# Patient Record
Sex: FEMALE | Race: WHITE
[De-identification: names, ages, dates, MRNs, and addresses within clinical notes are randomized per-mention and may not be internally consistent; named-entity substitution may affect disease eponyms.]

---

## 2019-01-14 ENCOUNTER — HOSPITAL ENCOUNTER (INPATIENT)
Dept: HOSPITAL 96 - M.REH | Age: 84
LOS: 12 days | Discharge: HOME HEALTH | DRG: 536 | End: 2019-01-26
Attending: PHYSICAL MEDICINE & REHABILITATION | Admitting: PHYSICAL MEDICINE & REHABILITATION
Payer: MEDICARE

## 2019-01-14 VITALS — DIASTOLIC BLOOD PRESSURE: 45 MMHG | SYSTOLIC BLOOD PRESSURE: 155 MMHG

## 2019-01-14 VITALS — WEIGHT: 129 LBS | HEIGHT: 64 IN | BODY MASS INDEX: 22.02 KG/M2

## 2019-01-14 VITALS — DIASTOLIC BLOOD PRESSURE: 46 MMHG | SYSTOLIC BLOOD PRESSURE: 162 MMHG

## 2019-01-14 DIAGNOSIS — Z98.42: ICD-10-CM

## 2019-01-14 DIAGNOSIS — Z86.711: ICD-10-CM

## 2019-01-14 DIAGNOSIS — Z90.12: ICD-10-CM

## 2019-01-14 DIAGNOSIS — Z88.2: ICD-10-CM
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
## 2019-01-14 DIAGNOSIS — M34.9: ICD-10-CM

## 2019-01-14 DIAGNOSIS — R53.81: ICD-10-CM

## 2019-01-14 DIAGNOSIS — E03.9: ICD-10-CM

## 2019-01-14 DIAGNOSIS — S72.002A: Primary | ICD-10-CM

## 2019-01-14 DIAGNOSIS — Z83.3: ICD-10-CM

## 2019-01-14 DIAGNOSIS — I12.9: ICD-10-CM

## 2019-01-14 DIAGNOSIS — N18.3: ICD-10-CM

## 2019-01-14 DIAGNOSIS — R32: ICD-10-CM
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
## 2019-01-14 DIAGNOSIS — Z87.440: ICD-10-CM

## 2019-01-14 DIAGNOSIS — Z98.41: ICD-10-CM

## 2019-01-14 DIAGNOSIS — Z85.3: ICD-10-CM

## 2019-01-14 DIAGNOSIS — Z91.048: ICD-10-CM

## 2019-01-14 DIAGNOSIS — G89.29: ICD-10-CM
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
## 2019-01-14 DIAGNOSIS — Z80.8: ICD-10-CM

## 2019-01-14 DIAGNOSIS — Z82.49: ICD-10-CM

## 2019-01-14 DIAGNOSIS — J45.909: ICD-10-CM
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
## 2019-01-14 DIAGNOSIS — Z90.710: ICD-10-CM

## 2019-01-14 DIAGNOSIS — Z91.040: ICD-10-CM

## 2019-01-14 DIAGNOSIS — M19.90: ICD-10-CM

## 2019-01-14 DIAGNOSIS — Z86.718: ICD-10-CM

## 2019-01-14 NOTE — H
[place] [address]                    HISTORY AND PHYSICAL          
_______________________________________________________________________________
 
Name:       JEMIMA JONES             Room:           [address] [place]  
[facility].#:  [id number]      Account #:      [id number]  
Admission:  01/14/19     Attend Phys:    Isabel Mcgee DO   
Discharge:               Date of Birth:  [birth year]  
         Report #: [id number]
                                                                     [id number]  
_______________________________________________________________________________
THIS REPORT FOR:  //name//                      
 
CC: Elly Mcgee
 
DATE OF SERVICE:  01/14/2019
 
 
[facility] code is 8.2.
 
HISTORY OF PRESENT ILLNESS:  This is an 85-year-old female admitted to inpatient
rehabilitation to facilitate safe discharge home, status post acute
hospitalization at [facility] on 01/09/2019 following a fall from standing
height, sustaining a left femur fracture of the neck, status post left
hemiarthroplasty, weightbearing as tolerated.  She also had a diagnosis and
history of UTI.  Previous level of function was independent with activities of
daily living.  Current level of function is minimum to moderate assistance of
1-2 depending on therapy, activity and time of day.  Estimated length of stay is
10-12 days with discharge disposition to the home setting where she has three
steps to enter as an accessible house.  She lives in an independent living
senior housing complex.  No changes since the preadmission screening.  Multiple
medical comorbidities requiring acute daily medical care including current UTI,
being treated with antibiotics; hypertension; history of cancer; depression;
chronic back and knee pain due to osteoarthritis; connective tissue disease;
herniated disk; PE and DVT in the past and sick sinus syndrome.
 
PAST MEDICAL HISTORY:  Left hip fracture post-hemiarthroplasty, UTI, essential
hypertension, left-sided breast cancer, hypothyroid, depression, chronic pain in
the back and knees, osteoarthritis, bilateral lower extremity in the knees,
connective tissue disease, lumbar herniated disk, lumbar radiculopathy, PE, DVT,
sick sinus syndrome, lupus, and asthma.
 
PAST SURGICAL HISTORY:  Hysterectomy, ankle fracture, tonsillectomy, cataract
surgery, back surgery, mastectomy and a hernia repair, L4-L5 microdiskectomy.
 
FAMILY HISTORY:  Cancer, hypertension, cardiac disease.
 
ALLERGIES:  ADHESIVE TAPE, SILICONE, LATEX, SULFA AND ADHESIVE.
 
SOCIAL HISTORY:  No tobacco, alcohol or illicit drug use.
 
MEDICATIONS:  Reviewed, reconciled and are available in the MAR.
 
REVIEW OF SYSTEMS:  A 14-point review of systems is done and is negative except
as mentioned in HPI, specifically no fever, chest pain, shortness of breath,
abdominal pain or distention, change in bowel or change in bladder.  Pain is
 
 
 
[place] [address]                    HISTORY AND PHYSICAL          
_______________________________________________________________________________
 
Name:       JEMIMA JONES             Room:           [address] [place]  
[facility]#:  [id number]      Account #:      [id number]  
Admission:  01/14/19     Attend Phys:    Isabel Mcgee DO   
Discharge:               Date of Birth:  [birth year]  
         Report #: [id number]
                                                                     [id number]  
_______________________________________________________________________________
well controlled.
 
PHYSICAL EXAMINATION:
GENERAL:  Alert, oriented, no apparent distress.
VITAL SIGNS:  Reviewed and are stable.
HEENT:  Head atraumatic, normocephalic.  Pupils equal, round, reactive.
ABDOMEN:  Soft, nontender, nondistended.
NEUROLOGIC:  Cranial nerves 2-12 are grossly intact.  No focal neuro deficits,
5/5 strength in bilateral upper and lower extremities.
SKIN:  Warm and dry.  No rashes or lesions noted.
MUSCULOSKELETAL:  No clubbing, cyanosis or edema.
 
ASSESSMENT:
1.  Status post fall sustaining a left femoral neck fracture, status post left
hemiarthroplasty on 01/09/2019.
2.  Multiple medical comorbidities requiring acute daily medical care.
3.  Multiple medical comorbidities.
4.  Debility with alterations in activities of daily living.
 
PLAN:
1.  Admission to inpatient rehabilitation.
2.  PT, OT, case management, nursing and HIMS to make evaluations and
recommendations.
3.  Based on her age of 85, we will do a cognitive evaluation.
4.  We will team her weekly and plan of care is pending.
 
 
 
 
 
 
 
 
 
 
 
 
 
 
 
 
 
 
 
                       
                                        By:                                
                 
D: 01/15/19 1712_______________________________________
T: 01/15/19 1812Isabel Mcgee DO               /nt

## 2019-01-14 NOTE — NUR
PT  PER W/C AND IS ALERT AND ORIENTATED. PT ORIENTATED TO ROOM AND
CONTROLS,FALL PRECAUTIONS AND REHAB PROGRAM.PT DENIES PAIN AT PRESENT. PHONE
AND CALL LIGHT IN REACH.FAMILY AT BEDSIDE.

## 2019-01-14 NOTE — D
[facility] 
[address] [place]  [address]                    DISCHARGE SUMMARY             
_______________________________________________________________________________
 
Name:       JEMIMA JONES             Room:           [address] [place]  
[facility]#:  [id number]      Account #:      [id number]  
Admission:  01/14/19     Attend Phys:    Isabel Mcgee DO   
Discharge:               Date of Birth:  [birth year]  
         Report #: [id number]
                                                                     [id number]  
_______________________________________________________________________________
THIS REPORT FOR:  //name//                      
 
CC: Elly Mcgee
 
DISCHARGE DIAGNOSIS:  Left hip fracture.
 
DISCHARGE DISPOSITION:  She is being discharged to home with home health
services, nursing, OT and PT.
 
FOLLOWUP:  She will follow up with Ortho in 1-2 weeks and with her primary care
provider in 1 week.
 
DISCHARGE INSTRUCTIONS:  Notifications for physician were given.
 
DIET:  She is on a regular diet.
 
Monitor weight gain.
 
ACTIVITY PRECAUTION AND LIMITATIONS:  Hip precautions and front wheel walker for
ambulation.
 
Okay to discharge home on 01/26/2019.
 
DISCHARGE MEDICATIONS:  Medications were reviewed and were reconciled by myself
and are available in the MAR.
 
A prescription was given for tramadol 50 mg tablet p.o. every 6 hours as needed,
#20.
 
DISCHARGE PHYSICAL EXAMINATION:
GENERAL:  Includes alert and oriented x 3, stable.
NEUROLOGIC:  Cranial nerves 2 through 12 within normal limits and intact.
ABDOMEN:  Nontender, nondistended.
LUNGS:  Symmetrical.
HEART:  Clear to auscultation bilaterally.
HEENT:  SUMMER LABOY.
 
 
 
 
 
 
 
                       
                                        By:                                
                 
D: 01/25/19 1205_______________________________________
T: 01/25/19 1221Kelmaria c Mcgee DO               /nt

## 2019-01-14 NOTE — PLAN
[address]  [address]                    REHAB UNIT PLAN OF CARE       
_______________________________________________________________________________
 
Name:       JEMIMA JONES             Room:           [address] [place]  
[facility].#:  [id number]      Account #:      [id number]  
Admission:  01/14/19     Attend Phys:    Isabel Mcgee DO   
Discharge:               Date of Birth:  [birth year]  
         Report #: [id number]
                                                                     [id number]  
_______________________________________________________________________________
THIS REPORT FOR:  //name//                      
 
CC: Elly Mcgee
 
This is an 85-year-old female status post fall from standing height, sustaining
a left femoral neck fracture, post left hemiarthroplasty on 01/09/2019 with
concomitant UTI being treated with antibiotics upon admission.  Previous level
of function was independent with all activities of daily living.  Current level
of function is minimum-to-moderate assistance of 1-2 depending on therapy,
activity and time of day.  Estimated length of stay is 10-12 days with discharge
disposition to the home setting where she lives in an accessible house,
independent living for seniors.
 
MEDICAL PROGNOSIS:  Good.
 
REHABILITATION PROGNOSIS:  Good.
 
Physical therapy will see the patient 60-90 minutes per day, 5 days per week,
working on upper and lower body strength, balance, coordination, navigation.
 
Occupational therapy will work with the patient 60-90 minutes per day, 5 days
per week, working on upper and lower body strength, balance, coordination,
navigation, bathing, dressing, and toileting.
 
Speech and language pathology based on her age and independent living status, we
will conduct a cognitive evaluation and treat the patient 30-60 minutes per day,
5 days per week as needed on expression, memory, strategies, cognition.
 
This is an overall plan of care, may change from time to time.  We will team
weekly and make changes to plan of care as needed.
 
 
 
 
 
 
 
 
 
 
 
 
 
                       
                                        By:                                
                 
D: 01/15/19 1713_______________________________________
T: 01/16/19 1216Isabel Mcgee DO               /nt

## 2019-01-15 VITALS — SYSTOLIC BLOOD PRESSURE: 134 MMHG | DIASTOLIC BLOOD PRESSURE: 39 MMHG

## 2019-01-15 VITALS — SYSTOLIC BLOOD PRESSURE: 142 MMHG | DIASTOLIC BLOOD PRESSURE: 42 MMHG

## 2019-01-15 LAB
ANION GAP SERPL CALC-SCNC: 8 MMOL/L (ref 7–16)
BUN SERPL-MCNC: 23 MG/DL (ref 7–18)
CALCIUM SERPL-MCNC: 9.1 MG/DL (ref 8.5–10.1)
CHLORIDE SERPL-SCNC: 105 MMOL/L (ref 98–107)
CO2 SERPL-SCNC: 27 MMOL/L (ref 21–32)
CREAT SERPL-MCNC: 1.1 MG/DL (ref 0.6–1.3)
GLUCOSE SERPL-MCNC: 94 MG/DL (ref 70–99)
HCT VFR BLD CALC: 23.3 % (ref 37–47)
HGB BLD-MCNC: 7.8 GM/DL (ref 12–15)
MCH RBC QN AUTO: 31.3 PG (ref 26–34)
MCHC RBC AUTO-ENTMCNC: 33.3 G/DL (ref 28–37)
MCV RBC: 94 FL (ref 80–100)
MPV: 7.7 FL. (ref 7.2–11.1)
PLATELET COUNT*: 282 THOU/UL (ref 150–400)
POTASSIUM SERPL-SCNC: 5 MMOL/L (ref 3.5–5.1)
RBC # BLD AUTO: 2.48 MIL/UL (ref 4.2–5)
RDW-CV: 13.3 % (ref 10.5–14.5)
SODIUM SERPL-SCNC: 140 MMOL/L (ref 136–145)
WBC # BLD AUTO: 8.3 THOU/UL (ref 4–11)

## 2019-01-15 NOTE — NUR
ASSUMED CARES AT 1920. ALERT AND ORIENTED X 4. PLEASANT. S/P LEFT HIP FRACTURE
WITH SURGERY. WBAT LLE. MEPILEX TO LEFT HIP INTACT. TRAMADOL GIVEN FOR PAIN.
TOOK PILLS WHOLE WITHOUT ISSUES. MIN ASSIST WITH GAIT BELT AND WALKER. UP TO
BATHROOM FEW TIMES DURING THE NIGHT. DOES OWN CARES. WEARS PERIPAD. RESTORIL
GIVEN PER PT REQUEST. SLEPT WELL MOST OF THE NIGHT. CALL LIGHT IN REACH AND
BED ALARM ON.

## 2019-01-15 NOTE — NUR
PT HAS PARTICIPATED WITH THERAPIES AND CALLS FOR ASSIST AS NEEDS. PT AMBULATES
WITH WALKER,GAITBELT AND MIN ASSIST WITH STEADY GIAT. PRN FOR PAIN PLAIN
TYLENOL GIVEN THIS AFTERNOON WITH GOOD EFFECT. DRESSING TO LT HIP DRY AND
INTACT.PT IS CONTINENT OF B+B AND ABLE TO ADJUST CLOTHING AND CLEANSE SELF.PT
IS ALERT AND ORIENTATED AND EATS MEALS IN DINNINGROOM. PT ORIENTATED TO REHAB
PROGRAM. HOURLY ROUNDING CONTINUES.

## 2019-01-15 NOTE — NUR
PT'S PHARMACY CALLED WITH PLAQUINIL MEDICATION CONFIRMED TO BE 400MG DAILY.
DISCUSSED WITH PT AND PT AGREES.

## 2019-01-15 NOTE — NUR
Nutrition: Visited with pt during lunch. She was eating, but also stated that
"it's hard to get very hungry while you're in here." Wt: 141#. Heart Healthy
diet. RX: statin, D3, folic acid, Fe, MVI. Admitted for hip FX. No concerns.
Consider low risk.

## 2019-01-15 NOTE — NUR
SW met with pt to complete initial assessment, introduce self, and SW role on
inpt rehab unit.  Pt alert, oriented, pleasant.  Pt lives at home alone.  Pt
has a friend who also lives in [place] who is supportive and pt says she
has other friends who live in her neighborhood and they all support each
other.  Pt has a dtr who is visiting for a while from [place], [place].  Pt has a
cane and a RW.  Pt has history of HH services after her knee surgeries in
2017.  Pt is uncertain of any dc needs at this time.  SW to continue to follow
to assist with safe dc planning.

## 2019-01-16 VITALS — SYSTOLIC BLOOD PRESSURE: 147 MMHG | DIASTOLIC BLOOD PRESSURE: 45 MMHG

## 2019-01-16 VITALS — DIASTOLIC BLOOD PRESSURE: 41 MMHG | SYSTOLIC BLOOD PRESSURE: 134 MMHG

## 2019-01-16 NOTE — NUR
PT IS ALERT AND ORIENTED X 4. RECEIVED TRAMADOL AND TYLENOL FOR LEG PAIN. UP
WITH SBA X 1 WITH GAIT BELT AND WALKER. PARTICIPATED IN THERAPIES DURING THE
DAY. RECEIVED NEW ORDER PER DR. MERLOS FOR HYDROXYCHLOROQUINE.  PT GIVEN PRN
MIRALAX IN AM. HOURLY ROUNDS MAINTAINED. PT WILL USE CALL LIGHT FOR
ASSISTANCE. CALL LIGHT WITHIN REACH.

## 2019-01-16 NOTE — NUR
ASSUMED CARE AT 1920. ALERT AND ORIENTED. PLEASANT. WBAT LLE. DRESSING TO LEFT
HIP INTACT. TAKES PILLS WHOLE. MIN ASSIST WITH GAIT BELT AND WALKER. UP TO
BATHROOM FEW TIMES. CAN GET UNSTEADY. PAIN MEDS GIVEN AS NEEDED. RESTORIL FOR
SLEEP. SLEPT WELL MOST OF THE NIGHT. CALL LIGHT IN REACH AND BED ALARM ON.

## 2019-01-16 NOTE — NUR
IQRA, Dr Mcgee and med student met with pt to review team conference summary and
plan for pt to remain on rehab unit to continue therapies with team to reteam
on Wednesday 1/23. Pt in agreement with plan.  SW to continue to follow to
assist with safe dc planning.

## 2019-01-17 VITALS — DIASTOLIC BLOOD PRESSURE: 40 MMHG | SYSTOLIC BLOOD PRESSURE: 130 MMHG

## 2019-01-17 VITALS — SYSTOLIC BLOOD PRESSURE: 147 MMHG | DIASTOLIC BLOOD PRESSURE: 46 MMHG

## 2019-01-17 LAB
BILIRUB UR-MCNC: NEGATIVE MG/DL
COLOR UR: YELLOW
KETONES UR STRIP-MCNC: NEGATIVE MG/DL
PROT UR QL STRIP: NEGATIVE
RBC # UR STRIP: NEGATIVE /UL
SP GR UR STRIP: 1.01 (ref 1–1.03)
URINE CLARITY: CLEAR
URINE GLUCOSE-RANDOM: NEGATIVE
URINE LEUKOCYTES-REFLEX: NEGATIVE
URINE NITRITE-REFLEX: NEGATIVE
UROBILINOGEN UR STRIP-ACNC: 0.2 E.U./DL (ref 0.2–1)

## 2019-01-17 NOTE — NUR
ASSUMED CARES AT 1920. ALERT AND ORIENTED. PLEASANT. TOOK PILLS WHOLE WITHOUT
ISSUES. MIN ASSIST WITH GAIT BELT AND WALKER. UP TO BATHROOM. DOES OWN CARES.
HAD DIFFICULTY GETTING COMFORTABLE IN BED D/T LEFT HIP PAIN. TRAMADOL GIVEN.
ADVISED PT TO REPOSITION. DID SLEEP BETTER AFTERWARDS. USING CALL LIGHT
APPROPRIATELY. BED ALARM ON.

## 2019-01-17 NOTE — NUR
ASSUMMED CARE OF PT AT 0730, PT ALERT AND ORIENTED, PT TRANSFERS WITH SBA/MIN
ASSIST, GB WALKER, DRESSING CLEAN DRY AND INTACT TO LEFT HIP, PT TAKING FOOD
AND FLUIDS WELL, PARTICIPATED IN ALL THERAPIES, HOURLY ROUNDING COMPLETED,
ASSESSMENT COMPLETE, WILL CONTINUE TO MONITOR.

## 2019-01-18 VITALS — DIASTOLIC BLOOD PRESSURE: 50 MMHG | SYSTOLIC BLOOD PRESSURE: 102 MMHG

## 2019-01-18 VITALS — DIASTOLIC BLOOD PRESSURE: 41 MMHG | SYSTOLIC BLOOD PRESSURE: 98 MMHG

## 2019-01-18 VITALS — DIASTOLIC BLOOD PRESSURE: 37 MMHG | SYSTOLIC BLOOD PRESSURE: 129 MMHG

## 2019-01-18 LAB
IRON SERPL-MCNC: 22 UG/DL (ref 50–175)
SAO2 % BLD FROM PO2: 11 % (ref 20–39)

## 2019-01-18 NOTE — NUR
ASSUMED PT CARE AT 1930.  PT SITTING UP IN RECLINER, ALERT AND ORIENTED X4.
TOOK PILLS WHOLE WITH WATER WITHOUT DIFFICULTY. UP TO BATHROOM WITH MIN
ASSIST, GAIT BELT AND WALKER.  DRESSING TO LEFT HIP C/D/I.  PT REQUESTED PRN
TRAMADOL AND TENAZEPAM AS HS THEN SLEPT WELL OVERNIGHT.  USES CALL LIGHT
APPROPRIATELY.  CALL LIGHT AND FREQUENTLY USED ITEMS WITHIN REACH. BED
ALARM ON FOR SAFETY. HOURLY ROUNDING IN PROGRESS, WILL CONTINUE TO MONITOR.

## 2019-01-18 NOTE — NUR
ASSUMMED CARE OF PT AT 0730, PT ALERT AND ORIENTED, TRANSFERS WITH SBA, GB
WALKER, COMPLAINS OF PAIN IN LEFT HIP MEDICATED PER ORDER, AMB TO BATHROOM TO
VOID, DRESSING C/D/I TO LEFT HIP, TAKING FOOD AND FLUIDS WELL, PARTICIPATED IN
ALL THERAPIES HOURLY ROUNDING COMPLETED, ASSESSMENT COMPLETE, WILL CONTINUE TO
MONITOR.

## 2019-01-19 VITALS — SYSTOLIC BLOOD PRESSURE: 133 MMHG | DIASTOLIC BLOOD PRESSURE: 35 MMHG

## 2019-01-19 VITALS — DIASTOLIC BLOOD PRESSURE: 51 MMHG | SYSTOLIC BLOOD PRESSURE: 129 MMHG

## 2019-01-19 NOTE — NUR
ASSUMED PT CARE AT 1930.  PT ALERT AND ORIENTED X4, POLITE AND COOPERATIVE
WITH CARES.  UP WITH SBA, GAIT BELT AND WALKER.  PRN PAIN MEDICATION AT SHIFT
CHANGE FOR LEFT HIP PAIN.  UP TO BATHROOM WITH GAITBELT AND WALKER SEVERAL
TIMES OVERNIGHT TO VOID.  DRESSING TO LEFT HIP C/D/I.  PRN TRAMADOL AND
TEMAZEPAM AT HS PER PT REQUEST.  PT SLEPT WELL OVERNIGHT.  USES CALL LIGHT
APPROPRIATELY.  CALL LIGHT AND FREQUENTLY USED ITEMS WITHIN REACH.  BED
ALALRM ON FOR SAFETY.  HOURLY ROUNDING IN PROGRESS, WILL CONTINUE TO MONITOR.

## 2019-01-19 NOTE — NUR
ASSUMED CARE AT 0730.  ALERT ORIENTED PLEASANT COOPERATIVE.  HX OF L HIP FX
WBATLLE.  DRESSING C/D/I L HIP.  TANSFERS WITH SBA G BELT WALKER AND AMBULATES
TO BR VOIDED AND HAD A BM ABLE TO DO HYGEINE AND CLOTHING ADJUSTMENTS.
MEDICATED X 3 FOR L HIP PAIN WITH TRAMADOL AND TYLENOL WITH SOME RELIEF
STATED.  PARTICIPATING IN THERAPIES UP IN RECLINER WITH BLES ELEVATED WHEN NOT
IN THERAPIES.  DAUGHTER HERE VISITING THIS AFTERNOON.

## 2019-01-20 VITALS — SYSTOLIC BLOOD PRESSURE: 156 MMHG | DIASTOLIC BLOOD PRESSURE: 43 MMHG

## 2019-01-20 VITALS — SYSTOLIC BLOOD PRESSURE: 185 MMHG | DIASTOLIC BLOOD PRESSURE: 58 MMHG

## 2019-01-20 NOTE — NUR
ASSUMED CARE AT 0730.  ALERT ORIENTED PLEASANT COOPERATIVE. HX OF L HIP FX
WBATLLE DRESSING C/D/I.  TRANSFERS WITH SBA G BELT WALKER AMBULATES TO BR TO
VOID HAD BM ABLE TO DO HYGEINE AND CLOTHING ADJUSTMENTS.  STATES STILL
EXPERIENCING DULL PAIN L HIP EVEN WITH TRAMADOL AND TYLENOL FOR PAIN.  ICE
PACK APPLIED ALSO SITTING IN RECLINER WITH BLES ELEVATED. DID OWN BATH AT SINK
AND DRESSED SELF.  FEEDS SELF TAKES MEDS WITHOUT DIFFICULTY.  MALE VISITOR
THIS AFTERNOON.

## 2019-01-21 VITALS — SYSTOLIC BLOOD PRESSURE: 146 MMHG | DIASTOLIC BLOOD PRESSURE: 46 MMHG

## 2019-01-21 VITALS — SYSTOLIC BLOOD PRESSURE: 152 MMHG | DIASTOLIC BLOOD PRESSURE: 47 MMHG

## 2019-01-21 NOTE — NUR
ASSUMED CARE AT 1920. ALERT AND ORIENTED. PLEASANT. C/O LEFT HIP PAIN. ICY HOT
AND TRAMADOL GIVEN. REPOSITION IN BED. SBA WITH GAIT AND WALKER. UP TO
BATHROOM. DOES OWN CARES. CALL LIGHT IN REACH AND BED ALARM ON.

## 2019-01-21 NOTE — NUR
ASSUMED CARE AT 0730.  ALERT ORIENTED PLEASANT COOPERATIVE.  HX OF L HIP FX
WBATLLE.  TRANSFERS WITH SBA G BELT WALKER AMBULATES TO BR TO VOID.  DRESSING
C/D/I TO L HIP.  PARTICIPATING IN THERAPIES THROUGHOUT THE DAY.  MEDICATED
WITH TRAMADOL 1 TAB AND 2 TYLENOL PO FOR MINOR HIP PAIN TODAY.  STATES PAIN IS
SO MUCH BETTER TODAY THAN YESTERDAY. FEEDS SELF AND TAKES MEDS WITHOUT
DIFFICULTY.  SITTING IN RECLINER WITH BLES ELEVATED WHEN NOT IN THERAPIES.

## 2019-01-22 VITALS — DIASTOLIC BLOOD PRESSURE: 41 MMHG | SYSTOLIC BLOOD PRESSURE: 152 MMHG

## 2019-01-22 VITALS — SYSTOLIC BLOOD PRESSURE: 152 MMHG | DIASTOLIC BLOOD PRESSURE: 40 MMHG

## 2019-01-22 NOTE — NUR
pt has participated with therapies and is mod i in room. pt is reminded to
continue to call for assist as needed. prn for pain given with good effect
today. dressing to lt hip dry and intact. pt alert and orientated, hourly
rounding continues.

## 2019-01-22 NOTE — NUR
ASSUMED PT CRE AT 1930.  PT ALERT AND ORIENTED X4, POLITE AND COOPERATIVE WITH
CARES.  HX OF L HIP FX, WBAT TO LLE.  TRANSFERS WITH SBA, GAIT BELT AND
WALKER.  UP TO BATHROOM TO VOID NUMEROUS TIMES OVERNIGHT. PRN TRAMADOL AND
TENAZEPAM AT HS PER PT REQUEST.  DRESSING TO LEFT HIP C/D/I. BED ALARM ON FOR
SAFETY.  USES CALL LIGHT APPROPRIATELY.  CALL LIGHT AND FREQUENTLY USED ITEMS
WITHIN REACH.  HOURLY ROUNDING IN PROGRESS, WILL CONTINUE TO MONITOR.

## 2019-01-22 NOTE — NUR
SITTING UP IN RECLINER WORKING ON A PUZZLE BOOK.  STATES LEFT HIP PAIN AT A
"2" BUT WANTS TO TAKE PAIN MED LATER.  DECLINED OFFER OF A SNACK.  TOOK
MEDICATIONS WHOLE WITH WATER WITHOUT DIFFICULTY.

## 2019-01-23 VITALS — DIASTOLIC BLOOD PRESSURE: 48 MMHG | SYSTOLIC BLOOD PRESSURE: 150 MMHG

## 2019-01-23 VITALS — SYSTOLIC BLOOD PRESSURE: 161 MMHG | DIASTOLIC BLOOD PRESSURE: 36 MMHG

## 2019-01-23 NOTE — NUR
PT HAS PARTYICIPATED WITH THERAPIES AND CALLS FOR ASSIST  AS NEEDED. PT IS MOD
I IN ROOM WITH WALKER. PT HAS MOVED TO TRANSITION ROOM THIS AFTERNOON. PRN FOR
PAIN GIVEN WITH GOOD EFFECT. PT REMAINS ALERT AND ORIENTATED.HOURLY ROUNDING
CONTINUES.

## 2019-01-23 NOTE — NUR
IQRA, Dr Mcgee and med student met with pt to review team conference summary and
plan for pt to dc home alone on Saturday 1/26 with HH services to follow.  Pt
has a friend who can provide a ride home on Saturday.  SW to call pt friend to
confirm.  Pt preference for [facility] services; SW to arrange prior to dc.
Pt in agreement with plan.

## 2019-01-23 NOTE — NUR
RESTED QUIETLY.  REMAINS MODIFIED INDEPENDENT IN ROOM WITH A WALKER.  RELIEF
WITH PAIN MEDICATIONS GIVEN LAST NIGHT UNTIL NOW.  TYLENOL GIVEN THIS MORNING
PER REQUEST FOR COMPLAINT OF LEFT HIP PAIN.  HOURLY ROUNDING IN PROGRESS.

## 2019-01-24 VITALS — SYSTOLIC BLOOD PRESSURE: 147 MMHG | DIASTOLIC BLOOD PRESSURE: 46 MMHG

## 2019-01-24 VITALS — DIASTOLIC BLOOD PRESSURE: 49 MMHG | SYSTOLIC BLOOD PRESSURE: 141 MMHG

## 2019-01-24 VITALS — DIASTOLIC BLOOD PRESSURE: 46 MMHG | SYSTOLIC BLOOD PRESSURE: 147 MMHG

## 2019-01-24 LAB
ANION GAP SERPL CALC-SCNC: 7 MMOL/L (ref 7–16)
BUN SERPL-MCNC: 32 MG/DL (ref 7–18)
CALCIUM SERPL-MCNC: 9.8 MG/DL (ref 8.5–10.1)
CHLORIDE SERPL-SCNC: 102 MMOL/L (ref 98–107)
CO2 SERPL-SCNC: 27 MMOL/L (ref 21–32)
CREAT SERPL-MCNC: 1.4 MG/DL (ref 0.6–1.3)
GLUCOSE SERPL-MCNC: 91 MG/DL (ref 70–99)
HCT VFR BLD CALC: 25.8 % (ref 37–47)
HGB BLD-MCNC: 8.4 GM/DL (ref 12–15)
MAGNESIUM SERPL-MCNC: 2.2 MG/DL (ref 1.8–2.4)
MCH RBC QN AUTO: 31 PG (ref 26–34)
MCHC RBC AUTO-ENTMCNC: 32.5 G/DL (ref 28–37)
MCV RBC: 95.4 FL (ref 80–100)
MPV: 7 FL. (ref 7.2–11.1)
PLATELET COUNT*: 674 THOU/UL (ref 150–400)
POTASSIUM SERPL-SCNC: 4.2 MMOL/L (ref 3.5–5.1)
RBC # BLD AUTO: 2.7 MIL/UL (ref 4.2–5)
RDW-CV: 14.7 % (ref 10.5–14.5)
SODIUM SERPL-SCNC: 136 MMOL/L (ref 136–145)
WBC # BLD AUTO: 12.1 THOU/UL (ref 4–11)

## 2019-01-24 NOTE — NUR
SW discussed pt dc planning in more detail with pt and pt friend.  Pt to dc
home alone on Saturday with pt friend to provide ride home. Pt reviewed all HH
choices and decided she would rather prefer [facility].  SW to
provide referral and orders prior to pt dc.  SW provided resources and
referrals and info regarding [facility].

## 2019-01-24 NOTE — NUR
Assumed patient care at 1900.  patient alert and oriented times four. able to
ambulate independently with the walker to the restroom and manage all
toileting caes independently.  Minor complaints of pain noed in left hip,
controlled with oral medication.  Wedge provided for sleep as patient does not
like to lie flat.  RN assessment and hourly rounding competed as documented.

## 2019-01-24 NOTE — NUR
I ASSUMED CARE OF THE PATIENT AT 0700.  SHE IS ALERT AND ORIENTED X4 AND IS UP
MOD I IN HER ROOM.  BED IS A REGULAR BED.  HOURLY ROUNDING IS COMPLETED AND
PATIENT NEEDS ARE MET.  PAIN IS MANAGED WITH PRN MEDS.  SHE SHOULD D/C ON
FRIDAY.  DRESSING IS C/D/I.  SKIN IS GOOD, VS ARE STABLE.  WILL CONTINUE TO
MONITOR.

## 2019-01-25 VITALS — DIASTOLIC BLOOD PRESSURE: 43 MMHG | SYSTOLIC BLOOD PRESSURE: 148 MMHG

## 2019-01-25 VITALS — DIASTOLIC BLOOD PRESSURE: 49 MMHG | SYSTOLIC BLOOD PRESSURE: 141 MMHG

## 2019-01-25 VITALS — SYSTOLIC BLOOD PRESSURE: 162 MMHG | DIASTOLIC BLOOD PRESSURE: 49 MMHG

## 2019-01-25 NOTE — NUR
ASSUMED CARES AT 1920. ALERT AND ORIENTED. PLEASANT. GABBY IN RM. PAIN MEDS
GIVEN FOR LEFT HIP PAIN. UP TO BATHROOM. DOES OWN CARES. SLEPT OFF AND ON.
CALL LIGHT IN REACH.

## 2019-01-25 NOTE — NUR
SITTING UP IN A CHAIR VISITING ON CELL PHONE.  NO COMPLAINTS OF DISCOMFORT.
MODIFIED INDEPENDENT IN ROOM WITH A WALKER.  SNACK PROVIDED.

## 2019-01-25 NOTE — NUR
Pt to dc home alone with friend support on Saturday 1/26. HH services to
follow; pt preference for [facility] and SW faxed referral, orders,
and med list to [facility] and spoke with Jono who accepted
referral.  Pt has a front wheeled rolling walker at home. Pt friend to provide
ride home.

## 2019-01-25 NOTE — NUR
ASSUMED CARE AT 0730.  ALERT ORIENTED PLEASANT COOPERATIVE.  HX OF L HIP FX
WBATLLE.  PT. IS MOD I IN HER ROOM UP WITH WALKER AND TO BR TO VOID.  DENIES
PAIN EARLY A.M.  DID C/O OF ACHE HIP AREA THIS NOON MEDICATED WITH TYLENOL 2
TABS PO.  SITTING UP IN RECLINER AT BEDSIDE WHEN NOT WITH THERAPIES.  FEEDS
SELF TAKES MEDS WITHOUT DIFFICULTY.

## 2019-01-26 VITALS — SYSTOLIC BLOOD PRESSURE: 144 MMHG | DIASTOLIC BLOOD PRESSURE: 42 MMHG

## 2019-01-26 VITALS — DIASTOLIC BLOOD PRESSURE: 49 MMHG | SYSTOLIC BLOOD PRESSURE: 141 MMHG

## 2019-01-26 NOTE — NUR
RESTED QUIETLY.  TOOK SELF TO THE BATHROOM DURING THE NIGHT WITHOUT
DIFFICULTY.  TYLENOL GIVEN PER REQUEST AT 0502 FOR COMPLAINT OF PAIN IN LEFT
HIP AND BACK RATED "2" WITH RELIEF.  REMAINS MODIFIED INDEPENDENT IN ROOM WITH
A WALKER.  PATIENT TO BE DISCHARGED TO HOME TODAY WITH HOME HEALTH.  HOURLY
ROUNDING IN PROGRESS.

## 2021-03-09 ENCOUNTER — HOSPITAL ENCOUNTER (EMERGENCY)
Dept: HOSPITAL 96 - M.ERS | Age: 86
Discharge: HOME | End: 2021-03-09
Payer: MEDICARE

## 2021-03-09 VITALS — DIASTOLIC BLOOD PRESSURE: 65 MMHG | SYSTOLIC BLOOD PRESSURE: 121 MMHG

## 2021-03-09 VITALS — WEIGHT: 117 LBS | BODY MASS INDEX: 21.53 KG/M2 | HEIGHT: 62 IN

## 2021-03-09 DIAGNOSIS — Z85.3: ICD-10-CM

## 2021-03-09 DIAGNOSIS — E03.9: ICD-10-CM

## 2021-03-09 DIAGNOSIS — I13.10: ICD-10-CM

## 2021-03-09 DIAGNOSIS — Z90.49: ICD-10-CM

## 2021-03-09 DIAGNOSIS — Z88.2: ICD-10-CM

## 2021-03-09 DIAGNOSIS — Z88.8: ICD-10-CM

## 2021-03-09 DIAGNOSIS — Z90.89: ICD-10-CM

## 2021-03-09 DIAGNOSIS — M32.9: ICD-10-CM

## 2021-03-09 DIAGNOSIS — Z90.710: ICD-10-CM
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
## 2021-03-09 DIAGNOSIS — S93.491A: Primary | ICD-10-CM
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
## 2021-03-09 DIAGNOSIS — N18.30: ICD-10-CM

## 2021-03-09 DIAGNOSIS — Z91.040: ICD-10-CM
